# Patient Record
Sex: MALE | Race: WHITE | NOT HISPANIC OR LATINO | Employment: PART TIME | URBAN - METROPOLITAN AREA
[De-identification: names, ages, dates, MRNs, and addresses within clinical notes are randomized per-mention and may not be internally consistent; named-entity substitution may affect disease eponyms.]

---
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
## 2024-10-10 ENCOUNTER — OFFICE VISIT (OUTPATIENT)
Dept: URGENT CARE | Facility: PHYSICIAN GROUP | Age: 19
End: 2024-10-10
Payer: COMMERCIAL

## 2024-10-10 VITALS
DIASTOLIC BLOOD PRESSURE: 74 MMHG | HEART RATE: 74 BPM | OXYGEN SATURATION: 98 % | SYSTOLIC BLOOD PRESSURE: 116 MMHG | TEMPERATURE: 97.4 F | BODY MASS INDEX: 26.73 KG/M2 | HEIGHT: 68 IN | WEIGHT: 176.4 LBS | RESPIRATION RATE: 14 BRPM

## 2024-10-10 DIAGNOSIS — J06.9 VIRAL URI WITH COUGH: ICD-10-CM

## 2024-10-10 LAB
FLUAV RNA SPEC QL NAA+PROBE: NEGATIVE
FLUBV RNA SPEC QL NAA+PROBE: NEGATIVE
RSV RNA SPEC QL NAA+PROBE: NEGATIVE
SARS-COV-2 RNA RESP QL NAA+PROBE: NEGATIVE

## 2024-10-10 PROCEDURE — 99213 OFFICE O/P EST LOW 20 MIN: CPT | Performed by: STUDENT IN AN ORGANIZED HEALTH CARE EDUCATION/TRAINING PROGRAM

## 2024-10-10 PROCEDURE — 3078F DIAST BP <80 MM HG: CPT | Performed by: STUDENT IN AN ORGANIZED HEALTH CARE EDUCATION/TRAINING PROGRAM

## 2024-10-10 PROCEDURE — 3074F SYST BP LT 130 MM HG: CPT | Performed by: STUDENT IN AN ORGANIZED HEALTH CARE EDUCATION/TRAINING PROGRAM

## 2024-10-10 PROCEDURE — 0241U POCT CEPHEID COV-2, FLU A/B, RSV - PCR: CPT | Performed by: STUDENT IN AN ORGANIZED HEALTH CARE EDUCATION/TRAINING PROGRAM

## 2024-10-10 ASSESSMENT — ENCOUNTER SYMPTOMS
MUSCULOSKELETAL NEGATIVE: 1
GASTROINTESTINAL NEGATIVE: 1
EYES NEGATIVE: 1
COUGH: 1
CHILLS: 1
CARDIOVASCULAR NEGATIVE: 1

## 2025-04-14 ENCOUNTER — OFFICE VISIT (OUTPATIENT)
Dept: URGENT CARE | Facility: PHYSICIAN GROUP | Age: 20
End: 2025-04-14
Payer: COMMERCIAL

## 2025-04-14 VITALS
DIASTOLIC BLOOD PRESSURE: 70 MMHG | TEMPERATURE: 97.6 F | WEIGHT: 180.2 LBS | SYSTOLIC BLOOD PRESSURE: 112 MMHG | RESPIRATION RATE: 17 BRPM | HEART RATE: 79 BPM | HEIGHT: 67 IN | OXYGEN SATURATION: 96 % | BODY MASS INDEX: 28.28 KG/M2

## 2025-04-14 DIAGNOSIS — J02.9 SORE THROAT: ICD-10-CM

## 2025-04-14 DIAGNOSIS — B00.1 COLD SORE: ICD-10-CM

## 2025-04-14 DIAGNOSIS — J02.0 STREP PHARYNGITIS: ICD-10-CM

## 2025-04-14 DIAGNOSIS — B34.9 VIRAL SYNDROME: ICD-10-CM

## 2025-04-14 LAB — S PYO DNA SPEC NAA+PROBE: DETECTED

## 2025-04-14 PROCEDURE — 99214 OFFICE O/P EST MOD 30 MIN: CPT | Performed by: NURSE PRACTITIONER

## 2025-04-14 PROCEDURE — 3074F SYST BP LT 130 MM HG: CPT | Performed by: NURSE PRACTITIONER

## 2025-04-14 PROCEDURE — 3078F DIAST BP <80 MM HG: CPT | Performed by: NURSE PRACTITIONER

## 2025-04-14 PROCEDURE — 87651 STREP A DNA AMP PROBE: CPT | Performed by: NURSE PRACTITIONER

## 2025-04-14 RX ORDER — AMOXICILLIN 500 MG/1
500 CAPSULE ORAL 2 TIMES DAILY
Qty: 20 CAPSULE | Refills: 0 | Status: SHIPPED | OUTPATIENT
Start: 2025-04-14 | End: 2025-04-24

## 2025-04-14 RX ORDER — VALACYCLOVIR HYDROCHLORIDE 1 G/1
1000 TABLET, FILM COATED ORAL 2 TIMES DAILY
Qty: 14 TABLET | Refills: 0 | Status: SHIPPED | OUTPATIENT
Start: 2025-04-14 | End: 2025-04-14

## 2025-04-14 RX ORDER — ACYCLOVIR 800 MG/1
800 TABLET ORAL 2 TIMES DAILY
Qty: 20 TABLET | Refills: 0 | Status: SHIPPED | OUTPATIENT
Start: 2025-04-14

## 2025-04-14 RX ORDER — VALACYCLOVIR HYDROCHLORIDE 1 G/1
1000 TABLET, FILM COATED ORAL 2 TIMES DAILY
Qty: 14 TABLET | Refills: 0 | Status: SHIPPED | OUTPATIENT
Start: 2025-04-14 | End: 2025-04-21

## 2025-04-14 RX ORDER — AMOXICILLIN 500 MG/1
500 CAPSULE ORAL 2 TIMES DAILY
Qty: 20 CAPSULE | Refills: 0 | Status: SHIPPED | OUTPATIENT
Start: 2025-04-14 | End: 2025-04-14

## 2025-04-14 NOTE — LETTER
April 15, 2025    April 14, 2025       Patient: Jayy Tran   YOB: 2005   Date of Visit: 4/14/2025         To Whom It May Concern:    In my medical opinion, I recommend that Jayy Tran be excused from work 4/14/2025 - 4/17/2025 due to illness.     If you have any questions or concerns, please don't hesitate to call [phone]          Sincerely,          Flor Evans, A.P.R.N.  Electronically Signed             Bianka Kwok, Med Ass't  [phone]

## 2025-04-14 NOTE — LETTER
April 14, 2025       Patient: Jayy Tran   YOB: 2005   Date of Visit: 4/14/2025         To Whom It May Concern:    In my medical opinion, I recommend that Jayy Tran be excused from work 4/14/2025 and 4/15/2025 due to illness.     If you have any questions or concerns, please don't hesitate to call [phone]          Sincerely,          GEO MederosREdouardN.  Electronically Signed

## 2025-04-15 NOTE — PROGRESS NOTES
Verbal consent was acquired by the patient to use [employer] ambient listening note generation during this visit          Chief Complaint   Patient presents with    Sinus Problem     X2days. Scratchy throat and ears, runny nose, mucus          History of Present Illness  The patient is a 19-year-old male who presents for evaluation of a sinus infection and cold sores.    He reports symptoms suggestive of a sinus infection, including an itchy throat, nasal congestion, rhinorrhea, and pruritus in the ears. He also experiences a dry cough and throat pain, which was more pronounced at night but has since evolved into a sensation of dryness. He has no history of streptococcal infections. He has been managing these symptoms with over-the-counter DayQuil Cold and Flu, which provided some relief during the night but was ineffective during his work shift.    Additionally, he notes the development of a cold sore the previous night, a condition that tends to manifest whenever he falls ill. He expresses concern about the potential for scarring from these cold sores. He has a scheduled appointment with his primary care physician in mid-May 2025 and is seeking a prescription for Valtrex prior to this consultation.    FAMILY HISTORY  His grandmother and mother get cold sores.    MEDICATIONS  Current: DayQuil Cold and Flu         ROS:    No severe shortness of breath   No cardiac like chest pain, as discussed   As otherwise stated in HPI    Medical/SX/ Social History:  Reviewed per chart    Pertinent Medications:    No current outpatient medications on file prior to visit.     No current facility-administered medications on file prior to visit.        Allergies:    Patient has no known allergies.     Problem list, medications, and allergies reviewed by myself today in Epic     Physical Exam:    Vitals:    04/14/25 1449   BP: 112/70   Pulse: 79   Resp: 17   Temp: 36.4 °C (97.6 °F)   SpO2: 96%             Physical Exam  Vitals and  nursing note reviewed.   Constitutional:       General: He is not in acute distress.     Appearance: Normal appearance. He is not ill-appearing or toxic-appearing.   HENT:      Head: Normocephalic and atraumatic.      Right Ear: Tympanic membrane, ear canal and external ear normal.      Left Ear: Tympanic membrane, ear canal and external ear normal.      Nose: Congestion and rhinorrhea present.      Mouth/Throat:      Mouth: Mucous membranes are moist.      Pharynx: Uvula midline. Pharyngeal swelling and posterior oropharyngeal erythema present. No oropharyngeal exudate, uvula swelling or postnasal drip.      Tonsils: No tonsillar exudate. 1+ on the right. 1+ on the left.   Eyes:      Extraocular Movements: Extraocular movements intact.      Conjunctiva/sclera: Conjunctivae normal.      Pupils: Pupils are equal, round, and reactive to light.   Cardiovascular:      Rate and Rhythm: Normal rate.      Pulses: Normal pulses.   Pulmonary:      Effort: Pulmonary effort is normal.   Musculoskeletal:         General: Normal range of motion.      Cervical back: Normal range of motion.   Skin:     General: Skin is warm.      Capillary Refill: Capillary refill takes less than 2 seconds.   Neurological:      General: No focal deficit present.      Mental Status: He is alert and oriented to person, place, and time.            Diagnostics:    Results for orders placed or performed in visit on 04/14/25   POCT GROUP A STREP, PCR    Collection Time: 04/14/25  3:05 PM   Result Value Ref Range    POC Group A Strep, PCR Detected (A) Not Detected, Invalid         Medical Decision making and plan :  I personally reviewed prior external notes and test results pertinent to today's visit. Pt is clinically stable at today's acute urgent care visit.  Patient appears nontoxic with no acute distress noted. Appropriate for outpatient care at this time.    Pleasant 19 y.o. male presented clinic with:     Assessment & Plan  1. Strep  pharyngitis.    Rapid POC Strep testing POSITIVE  Management includes completion of antibiotics, new toothbrush after day 3 of antibiotics, discarding/sanitizing any other dental equipment, soft foods, increased fluids, remain home from school for 24 hours.   Management of symptoms is discussed and expected course is outlined.   Patient instructed to return to office in 24-48 hours if not improving  Patient instructed to present to Emergency Room if notes unilateral swelling, muffled voice, difficulty handling secretions  I considered other causes of pharyngitis including Group C, G strep, peritonsillar abscess, alhaji's angina, and retropharyngeal abscess but the patient's reported symptoms and my exam do not support these alternative diagnosis based on information I have available today.  This may change and I encouraged the patient to return to clinic if they are experiencing new symptoms or their symptoms fail to resolve with time as we cannot rule out all pathology from a single Urgent Care visit.     2. Cold sores.  Chronic and ongoing. A prescription for Valtrex has been issued to [facility]. He is instructed to take Valtrex twice daily until symptoms x 7 days.  He will establish care with PCP at the end of May and they will take over care of this chronic condition.       Shared decision-making was utilized with patient for treatment plan. Medication discussed included indication for use and the potential benefits and side effects. Education was provided regarding the aforementioned assessments.  Differential Diagnosis, natural history, and supportive care discussed. All of the patient's questions were answered to their satisfaction at the time of discharge. Patient was encouraged to monitor symptoms closely. Those signs and symptoms which would warrant concern and mandate seeking a higher level of service through the emergency department discussed at length.  Patient stated agreement and understanding of  this plan of care.    Disposition:  Home in stable condition     Voice Recognition Disclaimer:  Portions of this document were created using voice recognition software and [employer] technology provided by [facility]. The software does have a chance of producing errors of grammar and possibly content. I have made every reasonable attempt to correct obvious errors, but there may be errors of grammar and possibly content that I did not discover before finalizing the  documentation.    TREVER Mederos.

## 2025-05-23 ENCOUNTER — APPOINTMENT (OUTPATIENT)
Dept: MEDICAL GROUP | Facility: LAB | Age: 20
End: 2025-05-23
Payer: COMMERCIAL

## 2025-06-18 ENCOUNTER — OFFICE VISIT (OUTPATIENT)
Dept: URGENT CARE | Facility: PHYSICIAN GROUP | Age: 20
End: 2025-06-18
Payer: COMMERCIAL

## 2025-06-18 VITALS
HEART RATE: 77 BPM | HEIGHT: 67 IN | OXYGEN SATURATION: 100 % | SYSTOLIC BLOOD PRESSURE: 136 MMHG | TEMPERATURE: 98 F | DIASTOLIC BLOOD PRESSURE: 80 MMHG | BODY MASS INDEX: 28.03 KG/M2 | RESPIRATION RATE: 16 BRPM | WEIGHT: 178.6 LBS

## 2025-06-18 DIAGNOSIS — S61.412A LACERATION OF LEFT HAND WITHOUT FOREIGN BODY, INITIAL ENCOUNTER: Primary | ICD-10-CM

## 2025-06-18 PROCEDURE — 12002 RPR S/N/AX/GEN/TRNK2.6-7.5CM: CPT | Performed by: NURSE PRACTITIONER

## 2025-06-18 PROCEDURE — 3079F DIAST BP 80-89 MM HG: CPT | Performed by: NURSE PRACTITIONER

## 2025-06-18 PROCEDURE — 3075F SYST BP GE 130 - 139MM HG: CPT | Performed by: NURSE PRACTITIONER

## 2025-06-19 NOTE — PROGRESS NOTES
Verbal consent was acquired by the patient to use [employer] ambient listening note generation during this visit          Chief Complaint   Patient presents with    Laceration     Left knuckle cut with plastic           History of Present Illness  The patient is a 19-year-old male who presents for evaluation of a hand laceration.    He sustained the injury approximately 30 minutes prior to his arrival while attempting to separate the plastic lens from a headlight. He reports minimal pain, describing it as a stinging sensation. The bleeding was not severe, and he managed to control it by washing the wound with water and applying pressure. His tetanus status is up-to-date, with the last booster administered during his high school years. He has a history of similar injuries, having required sutures on his fingers on several occasions.         ROS:    No severe shortness of breath   No cardiac like chest pain, as discussed   As otherwise stated in HPI    Medical/SX/ Social History:  Reviewed per chart    Pertinent Medications:    Medications Ordered Prior to Encounter[1]     Allergies:    Patient has no known allergies.     Problem list, medications, and allergies reviewed by myself today in Epic     Physical Exam:    Vitals:    06/18/25 1837   BP: 136/80   Pulse: 77   Resp: 16   Temp: 36.7 °C (98 °F)   SpO2: 100%             Physical Exam  Vitals and nursing note reviewed.   Constitutional:       General: He is not in acute distress.     Appearance: Normal appearance. He is not ill-appearing or toxic-appearing.   HENT:      Head: Normocephalic and atraumatic.      Nose: Nose normal.      Mouth/Throat:      Mouth: Mucous membranes are moist.      Pharynx: Oropharynx is clear.   Eyes:      Extraocular Movements: Extraocular movements intact.      Conjunctiva/sclera: Conjunctivae normal.      Pupils: Pupils are equal, round, and reactive to light.   Cardiovascular:      Rate and Rhythm: Normal rate.      Pulses: Normal  pulses.   Pulmonary:      Effort: Pulmonary effort is normal.   Musculoskeletal:         General: Normal range of motion.      Cervical back: Normal range of motion.      Comments: There are two lacerations to the back of the left hand.  One is in-between the index and middle finger at the base of these two fingers. The other is over the first MCP joint of the index finger.  Both wounds are superficial in nature and have well approximated wound edges.  They are about 2.0 and 2.5 cm respectively.  Wound edges are well approximated.  There is full ROM of the hand and fingers without pain.  Sensation and strength are intact.  Capillary refill is intact.      Both wounds were cleansed and irrigated with Hibiclens.  No foreign bodies were identified.     Skin:     General: Skin is warm.      Capillary Refill: Capillary refill takes less than 2 seconds.   Neurological:      General: No focal deficit present.      Mental Status: He is alert and oriented to person, place, and time.          Medical Decision making and plan :  I personally reviewed prior external notes and test results pertinent to today's visit. Pt is clinically stable at today's acute urgent care visit.  Patient appears nontoxic with no acute distress noted. Appropriate for outpatient care at this time.    Pleasant 19 y.o. male presented clinic with:     Assessment & Plan  1. Laceration of the left hand.   - The laceration occurred while attempting to remove the plastic lens of a headlight.  - The wound was cleaned and prepared for suturing.  - Approximately 3 stitches will be placed in one area and 2 stitches in another.  - Advised to keep the wound clean and covered, especially while at work, to prevent infection. Tetanus status is up to date, so no additional tetanus shot is required.    The wound was thoroughly irrigated with copious amount of normal saline.   Area was then prepped in the usual sterile fashion.    Local field block was obtained with 1%  Lidocaine without Epinephrine.    Wound was cleansed and debrided of as much devitalized tissue as possible.  Wound explored and found to be relatively superficial and no foreign bodies appreciated.  I approximated the wound edges and closed the laceration with  interrupted sutures of #5-0 ETHILON monofilament.  Area was then cleansed and dressed.    Wound care instructions and ER precautions given.   RTC in 7-10 d for wound check/suture removal.     Tetanus vaccination UTD, patient remembers this vaccine a couple of years ago.  Last one we have record of is 2016.  Patient defers.      Post-Procedure:  - Tolerance Level: Patient tolerated the procedure well.  - Home Care Instructions: Advised to keep the wound clean and dry, change the dressing daily, avoid heavy use of the hand, and return for suture removal in 7-10 days. Instructed to watch for signs of infection such as increased redness, swelling, or discharge.      Shared decision-making was utilized with patient for treatment plan. Medication discussed included indication for use and the potential benefits and side effects. Education was provided regarding the aforementioned assessments.  Differential Diagnosis, natural history, and supportive care discussed. All of the patient's questions were answered to their satisfaction at the time of discharge. Patient was encouraged to monitor symptoms closely. Those signs and symptoms which would warrant concern and mandate seeking a higher level of service through the emergency department discussed at length.  Patient stated agreement and understanding of this plan of care.    Disposition:  Home in stable condition     Voice Recognition Disclaimer:  Portions of this document were created using voice recognition software and [employer] technology provided by [employer]. The software does have a chance of producing errors of grammar and possibly content. I have made every reasonable attempt to correct obvious errors, but there  may be errors of grammar and possibly content that I did not discover before finalizing the  documentation.    TREVER Mederos.        [1]   Current Outpatient Medications on File Prior to Visit   Medication Sig Dispense Refill    acyclovir (ZOVIRAX) 800 MG Tab Take 1 Tablet by mouth 2 times a day. 20 Tablet 0     No current facility-administered medications on file prior to visit.

## 2025-06-20 ENCOUNTER — OFFICE VISIT (OUTPATIENT)
Dept: MEDICAL GROUP | Facility: LAB | Age: 20
End: 2025-06-20
Payer: COMMERCIAL

## 2025-06-20 ENCOUNTER — HOSPITAL ENCOUNTER (OUTPATIENT)
Dept: LAB | Facility: MEDICAL CENTER | Age: 20
End: 2025-06-20
Attending: PHYSICIAN ASSISTANT
Payer: COMMERCIAL

## 2025-06-20 ENCOUNTER — RESULTS FOLLOW-UP (OUTPATIENT)
Dept: MEDICAL GROUP | Facility: LAB | Age: 20
End: 2025-06-20

## 2025-06-20 VITALS
BODY MASS INDEX: 27.78 KG/M2 | OXYGEN SATURATION: 95 % | DIASTOLIC BLOOD PRESSURE: 70 MMHG | TEMPERATURE: 96.9 F | HEART RATE: 66 BPM | WEIGHT: 177 LBS | HEIGHT: 67 IN | SYSTOLIC BLOOD PRESSURE: 106 MMHG | RESPIRATION RATE: 16 BRPM

## 2025-06-20 DIAGNOSIS — Z00.00 ENCOUNTER FOR PREVENTIVE HEALTH EXAMINATION: ICD-10-CM

## 2025-06-20 DIAGNOSIS — Z23 NEED FOR VACCINATION: ICD-10-CM

## 2025-06-20 DIAGNOSIS — B00.1 COLD SORE: ICD-10-CM

## 2025-06-20 DIAGNOSIS — Z80.0 FAMILY HISTORY OF COLON CANCER: ICD-10-CM

## 2025-06-20 DIAGNOSIS — Z83.3 FAMILY HISTORY OF DIABETES MELLITUS: ICD-10-CM

## 2025-06-20 DIAGNOSIS — Z83.49 FAMILY HISTORY OF HYPOTHYROIDISM: ICD-10-CM

## 2025-06-20 DIAGNOSIS — Z00.00 ENCOUNTER FOR PREVENTIVE HEALTH EXAMINATION: Primary | ICD-10-CM

## 2025-06-20 DIAGNOSIS — Z80.6 FAMILY HISTORY OF LEUKEMIA: ICD-10-CM

## 2025-06-20 LAB
ALBUMIN SERPL BCP-MCNC: 4.8 G/DL (ref 3.2–4.9)
ALBUMIN/GLOB SERPL: 2.2 G/DL
ALP SERPL-CCNC: 69 U/L (ref 30–99)
ALT SERPL-CCNC: 33 U/L (ref 2–50)
ANION GAP SERPL CALC-SCNC: 10 MMOL/L (ref 7–16)
AST SERPL-CCNC: 44 U/L (ref 12–45)
BILIRUB SERPL-MCNC: 1 MG/DL (ref 0.1–1.5)
BUN SERPL-MCNC: 17 MG/DL (ref 8–22)
CALCIUM ALBUM COR SERPL-MCNC: 9 MG/DL (ref 8.5–10.5)
CALCIUM SERPL-MCNC: 9.6 MG/DL (ref 8.5–10.5)
CHLORIDE SERPL-SCNC: 105 MMOL/L (ref 96–112)
CHOLEST SERPL-MCNC: 262 MG/DL (ref 100–199)
CO2 SERPL-SCNC: 23 MMOL/L (ref 20–33)
CREAT SERPL-MCNC: 1.05 MG/DL (ref 0.5–1.4)
ERYTHROCYTE [DISTWIDTH] IN BLOOD BY AUTOMATED COUNT: 40.8 FL (ref 35.9–50)
GFR SERPLBLD CREATININE-BSD FMLA CKD-EPI: 104 ML/MIN/1.73 M 2
GLOBULIN SER CALC-MCNC: 2.2 G/DL (ref 1.9–3.5)
GLUCOSE SERPL-MCNC: 90 MG/DL (ref 65–99)
HCT VFR BLD AUTO: 50.7 % (ref 42–52)
HCV AB SER QL: NORMAL
HDLC SERPL-MCNC: 42 MG/DL
HGB BLD-MCNC: 17.2 G/DL (ref 14–18)
LDLC SERPL CALC-MCNC: 180 MG/DL
MCH RBC QN AUTO: 30.6 PG (ref 27–33)
MCHC RBC AUTO-ENTMCNC: 33.9 G/DL (ref 32.3–36.5)
MCV RBC AUTO: 90.1 FL (ref 81.4–97.8)
PLATELET # BLD AUTO: 254 K/UL (ref 164–446)
PMV BLD AUTO: 11.2 FL (ref 9–12.9)
POTASSIUM SERPL-SCNC: 4.8 MMOL/L (ref 3.6–5.5)
PROT SERPL-MCNC: 7 G/DL (ref 6–8.2)
RBC # BLD AUTO: 5.63 M/UL (ref 4.7–6.1)
SODIUM SERPL-SCNC: 138 MMOL/L (ref 135–145)
TRIGL SERPL-MCNC: 199 MG/DL (ref 0–149)
TSH SERPL DL<=0.005 MIU/L-ACNC: 1.07 UIU/ML (ref 0.38–5.33)
WBC # BLD AUTO: 6.6 K/UL (ref 4.8–10.8)

## 2025-06-20 PROCEDURE — 36415 COLL VENOUS BLD VENIPUNCTURE: CPT

## 2025-06-20 PROCEDURE — 3074F SYST BP LT 130 MM HG: CPT | Performed by: PHYSICIAN ASSISTANT

## 2025-06-20 PROCEDURE — 90471 IMMUNIZATION ADMIN: CPT | Performed by: PHYSICIAN ASSISTANT

## 2025-06-20 PROCEDURE — 3078F DIAST BP <80 MM HG: CPT | Performed by: PHYSICIAN ASSISTANT

## 2025-06-20 PROCEDURE — 84443 ASSAY THYROID STIM HORMONE: CPT

## 2025-06-20 PROCEDURE — 90621 MENB-FHBP VACC 2/3 DOSE IM: CPT | Mod: JZ | Performed by: PHYSICIAN ASSISTANT

## 2025-06-20 PROCEDURE — 86803 HEPATITIS C AB TEST: CPT

## 2025-06-20 PROCEDURE — 80053 COMPREHEN METABOLIC PANEL: CPT

## 2025-06-20 PROCEDURE — 85027 COMPLETE CBC AUTOMATED: CPT

## 2025-06-20 PROCEDURE — 80061 LIPID PANEL: CPT

## 2025-06-20 PROCEDURE — 99213 OFFICE O/P EST LOW 20 MIN: CPT | Mod: 25 | Performed by: PHYSICIAN ASSISTANT

## 2025-06-20 RX ORDER — ACYCLOVIR 800 MG/1
800 TABLET ORAL 2 TIMES DAILY
Qty: 20 TABLET | Refills: 0 | Status: CANCELLED | OUTPATIENT
Start: 2025-06-20

## 2025-06-20 ASSESSMENT — PATIENT HEALTH QUESTIONNAIRE - PHQ9: CLINICAL INTERPRETATION OF PHQ2 SCORE: 0

## 2025-06-20 NOTE — ASSESSMENT & PLAN NOTE
Patient uses acyclovir as needed for cold sore outbreak.  Symptoms are well-controlled and patient is aware of his triggers.

## 2025-06-20 NOTE — PROGRESS NOTES
Subjective  Jayy Tran is a 19 y.o. male who presents today to establish care.    Previously seen by pediatrician, Dr. Mohsen Garcia    He is status post suture closure following laceration to left hand while attempting to remove plastic lens from a headlight.  He is doing well, there are no signs of infection to the area  Denies fever, chills, nausea    History of cold sores  Patient reports his triggers are going out into the cold following a hot shower, and being under a lot of stress.  Patient has been utilizing acyclovir for symptom control.     Heart murmur  Patient reports that he was found to have a heart murmur when he was 13 years old while he was playing a combination of for sports.  He was advised to slow down, and symptoms resolved.  He denies any palpitations, chest pain, shortness of breath.    History of ear infections when he was 1 to 1 years old  Currently not experiencing any ear pain or discharge.     Tinnitus  Followed by ENT Dr. Braeden Jeknins at [facility]  Patient was given recommendations to help reduce symptoms    Health maintenance  Oral hygiene: Dental cleanings every 12 months, and sees a dentist every year  Eye care: Sees an optometrist yearly.  Patient has astigmatism. Wears glasses for driving at night or with certain weather conditions  Mental health: Well-groomed, does not report any recent adverse events  Medications: None.  Acyclovir as needed for cold sore  Sleep: Sleeping well, did not report trouble falling asleep or staying asleep  Diet/exercise: Balanced diet, exercises regularly. Weekly runs with his mom, also enjoys going to the park and being active with his friends.  BM: Daily, soft.  Not straining.  Denied hematochezia  : Denied urgency, frequency, dysuria, and hematuria  Sexually active: No monogamous relationship, denied STI testing, uses condoms for contraception    Family Hx: Patient's mom, Keyla was able to assist with family history  Mother,  Keyla: Bladder CA at age 24 former smoker. Yearly cystoscopies.   Father: Liver cirosis from alcoholism  Paternal grandmother: Leukemia   Maternal Grandfather: Cholangiocarcinoma in the liver, Hypothyroidism   Mom's grandfather: colon cancer cancer in his 60s and ostomy bag   Maternal Grandmother: DM2, HTN, Hypothyroidism ([place]mmiotosi )       Social History     Socioeconomic History    Marital status: Single     Spouse name: Not on file    Number of children: Not on file    Years of education: Not on file    Highest education level: Not on file   Occupational History    Not on file   Tobacco Use    Smoking status: Never    Smokeless tobacco: Never   Vaping Use    Vaping status: Every Day    Substances: Nicotine   Substance and Sexual Activity    Alcohol use: Yes     Comment: occ    Drug use: No    Sexual activity: Never   Other Topics Concern    Behavioral problems Not Asked    Interpersonal relationships Not Asked    Sad or not enjoying activities Not Asked    Suicidal thoughts Not Asked    Poor school performance Not Asked    Reading difficulties Not Asked    Speech difficulties Not Asked    Writing difficulties Not Asked    Inadequate sleep Not Asked    Excessive TV viewing Not Asked    Excessive video game use Not Asked    Inadequate exercise Not Asked    Sports related Not Asked    Poor diet Not Asked    Family concerns for drug/alcohol abuse Not Asked    Poor oral hygiene Not Asked    Bike safety Not Asked    Family concerns vehicle safety Not Asked   Social History Narrative    Not on file     Social Drivers of Health     Financial Resource Strain: Not on file   Food Insecurity: Not on file   Transportation Needs: Not on file   Physical Activity: Not on file   Stress: Not on file   Social Connections: Not on file   Intimate Partner Violence: Not on file   Housing Stability: Not on file       Past Surgical History[1]    Past Medical History[2]    Current Medications[3]    Allergies[4]    Objective  /70  "(BP Location: Right arm, Patient Position: Sitting, BP Cuff Size: Adult)   Pulse 66   Temp 36.1 °C (96.9 °F) (Temporal)   Resp 16   Ht 1.702 m (5' 7\")   Wt 80.3 kg (177 lb)   SpO2 95%   Physical Exam  Constitutional:       Appearance: Normal appearance.   HENT:      Right Ear: Tympanic membrane, ear canal and external ear normal. There is no impacted cerumen.      Left Ear: Tympanic membrane, ear canal and external ear normal. There is no impacted cerumen.      Mouth/Throat:      Mouth: Mucous membranes are moist.      Pharynx: Oropharynx is clear. No oropharyngeal exudate or posterior oropharyngeal erythema.   Eyes:      General:         Right eye: No discharge.         Left eye: No discharge.      Extraocular Movements: Extraocular movements intact.      Pupils: Pupils are equal, round, and reactive to light.   Cardiovascular:      Rate and Rhythm: Normal rate and regular rhythm.   Pulmonary:      Effort: Pulmonary effort is normal.      Breath sounds: Normal breath sounds.   Musculoskeletal:      Cervical back: Normal range of motion.      Right lower leg: No edema.      Left lower leg: No edema.   Lymphadenopathy:      Cervical: No cervical adenopathy.   Skin:     General: Skin is warm and dry.   Neurological:      Mental Status: He is alert.   Psychiatric:         Mood and Affect: Mood normal.         Behavior: Behavior normal.         Thought Content: Thought content normal.         Labs:   Component      Latest Ref [place] 11/2/2020   WBC      4.8 - 10.8 K/uL 5.7    RBC      4.70 - 6.10 M/uL 5.53    Hemoglobin      14.0 - 18.0 g/dL 16.9    Hematocrit      42.0 - 52.0 % 49.8    MCV      81.4 - 97.8 fL 90.1    MCH      27.0 - 33.0 pg 30.6    MCHC      33.7 - 35.3 g/dL 33.9    RDW      37.1 - 44.2 fL 41.0    Platelet Count      164 - 446 K/uL 242    MPV      9.0 - 12.9 fL 11.6    Neutrophils-Polys      44.00 - 72.00 % 51.20    Lymphocytes      22.00 - 41.00 % 39.40    Monocytes      0.00 - 13.40 % 6.20  "   Eosinophils      0.00 - 4.00 % 2.30    Basophils      0.00 - 1.80 % 0.70    Immature Granulocytes      0.00 - 0.30 % 0.20    Nucleated RBC      /100 WBC 0.00    Neutrophils (Absolute)      1.54 - 7.04 K/uL 2.90    Lymphs (Absolute)      1.00 - 4.80 K/uL 2.23    Monos (Absolute)      0.18 - 0.78 K/uL 0.35    Eos (Absolute)      0.00 - 0.38 K/uL 0.13    Baso (Absolute)      0.00 - 0.05 K/uL 0.04    Immature Granulocytes (abs)      0.00 - 0.03 K/uL 0.01    NRBC (Absolute)      K/uL 0.00    Sodium      135 - 145 mmol/L 143    Potassium      3.6 - 5.5 mmol/L 4.1    Chloride      96 - 112 mmol/L 106    Co2      20 - 33 mmol/L 26    Anion Gap      7.0 - 16.0  11.0    Glucose      40 - 99 mg/dL 90    Bun      8 - 22 mg/dL 11    Creatinine      0.50 - 1.40 mg/dL 1.00    Calcium      8.5 - 10.5 mg/dL 9.6    AST(SGOT)      12 - 45 U/L 10 (L)    ALT(SGPT)      2 - 50 U/L 10    Alkaline Phosphatase      80 - 250 U/L 75 (L)    Total Bilirubin      0.1 - 1.2 mg/dL 0.6    Albumin      3.2 - 4.9 g/dL 4.6    Total Protein      6.0 - 8.2 g/dL 6.9    Globulin      1.9 - 3.5 g/dL 2.3    A-G Ratio      g/dL 2.0    TSH      0.680 - 3.350 uIU/mL 0.924       Legend:  (L) Low    Assessment & Plan  Encounter for preventive health examination  A lipid panel, thyroid function tests, A1c, CBC, and CMP will be ordered. Advised to fast for 8 to 10 hours prior to the blood draw.    Due to family history of a variety of cancers I recommend moving forward with the [facility] project for genetic testing. The genomic study is at no-cost for [facility] and gives participants insight into different genetic traits, including results on three prevalent and serious health conditions.  Patient verbalizes understanding and agreed to proceed.    Orders:    Lipid Profile; Future    CBC WITHOUT DIFFERENTIAL; Future    Comp Metabolic Panel; Future    TSH WITH REFLEX TO FT4; Future    HEP C VIRUS ANTIBODY; Future    Cold sore  Patient uses acyclovir as needed  for cold sore outbreak.  Symptoms are well-controlled and patient is aware of his triggers.               1. Encounter for preventive health examination  - Lipid Profile; Future  - CBC WITHOUT DIFFERENTIAL; Future  - Comp Metabolic Panel; Future  - TSH WITH REFLEX TO FT4; Future  - HEP C VIRUS ANTIBODY; Future    2. Cold sore    3. Need for vaccination  - Meningococcal Vaccine Serogroup B 2-3 Dose (TRUMENBA)    4. Family history of colon cancer  - Referral to Genetic Research Studies    5. Family history of diabetes mellitus  - Referral to Genetic Research Studies    6. Family history of leukemia  - Referral to Genetic Research Studies    7. Family history of hypothyroidism  - Referral to Genetic Research Studies           [1]   Past Surgical History:  Procedure Laterality Date    TYMPANOTOMY     [2]   Past Medical History:  Diagnosis Date    Arrhythmia     2012   [3]   Current Outpatient Medications   Medication Sig Dispense Refill    acyclovir (ZOVIRAX) 800 MG Tab Take 1 Tablet by mouth 2 times a day. 20 Tablet 0     No current facility-administered medications for this visit.   [4] No Known Allergies

## 2025-06-20 NOTE — ASSESSMENT & PLAN NOTE
A lipid panel, thyroid function tests, A1c, CBC, and CMP will be ordered. Advised to fast for 8 to 10 hours prior to the blood draw.    Due to family history of a variety of cancers I recommend moving forward with the [facility] project for genetic testing. The genomic study is at no-cost for [facility] and gives participants insight into different genetic traits, including results on three prevalent and serious health conditions.  Patient verbalizes understanding and agreed to proceed.    Orders:    Lipid Profile; Future    CBC WITHOUT DIFFERENTIAL; Future    Comp Metabolic Panel; Future    TSH WITH REFLEX TO FT4; Future    HEP C VIRUS ANTIBODY; Future

## 2025-06-27 ENCOUNTER — OFFICE VISIT (OUTPATIENT)
Dept: MEDICAL GROUP | Facility: LAB | Age: 20
End: 2025-06-27
Payer: COMMERCIAL

## 2025-06-27 VITALS
HEIGHT: 67 IN | RESPIRATION RATE: 18 BRPM | HEART RATE: 81 BPM | SYSTOLIC BLOOD PRESSURE: 102 MMHG | BODY MASS INDEX: 27.58 KG/M2 | WEIGHT: 175.71 LBS | TEMPERATURE: 98.6 F | DIASTOLIC BLOOD PRESSURE: 80 MMHG | OXYGEN SATURATION: 96 %

## 2025-06-27 DIAGNOSIS — Z48.02 VISIT FOR SUTURE REMOVAL: Primary | ICD-10-CM

## 2025-06-27 ASSESSMENT — FIBROSIS 4 INDEX: FIB4 SCORE: 0.6

## 2025-06-27 NOTE — PROGRESS NOTES
Subjective  Jayy Tran is a 19 y.o. male who presents today for suture removal.    He is status post suture closure following laceration to left hand while attempting to remove plastic lens from a headlight.  He is doing well, there are no signs of infection to the area  Denies fever, chills, nausea, or bleeding from suture site.    No family history on file.    Social History     Socioeconomic History    Marital status: Single     Spouse name: Not on file    Number of children: Not on file    Years of education: Not on file    Highest education level: Not on file   Occupational History    Not on file   Tobacco Use    Smoking status: Never    Smokeless tobacco: Never   Vaping Use    Vaping status: Every Day    Substances: Nicotine   Substance and Sexual Activity    Alcohol use: Yes     Comment: occ    Drug use: No    Sexual activity: Never   Other Topics Concern    Behavioral problems Not Asked    Interpersonal relationships Not Asked    Sad or not enjoying activities Not Asked    Suicidal thoughts Not Asked    Poor school performance Not Asked    Reading difficulties Not Asked    Speech difficulties Not Asked    Writing difficulties Not Asked    Inadequate sleep Not Asked    Excessive TV viewing Not Asked    Excessive video game use Not Asked    Inadequate exercise Not Asked    Sports related Not Asked    Poor diet Not Asked    Family concerns for drug/alcohol abuse Not Asked    Poor oral hygiene Not Asked    Bike safety Not Asked    Family concerns vehicle safety Not Asked   Social History Narrative    Not on file     Social Drivers of Health     Financial Resource Strain: Not on file   Food Insecurity: Not on file   Transportation Needs: Not on file   Physical Activity: Not on file   Stress: Not on file   Social Connections: Not on file   Intimate Partner Violence: Not on file   Housing Stability: Not on file       Past Surgical History[1]    Past Medical History[2]    Current  "Medications[3]    Allergies[4]    Objective  /80 (BP Location: Left arm, Patient Position: Sitting, BP Cuff Size: Adult)   Pulse 81   Temp 37 °C (98.6 °F) (Temporal)   Resp 18   Ht 1.702 m (5' 7\")   Wt 79.7 kg (175 lb 11.3 oz)   SpO2 96%   Physical Exam  Constitutional:       Appearance: Normal appearance.   HENT:      Mouth/Throat:      Mouth: Mucous membranes are moist.      Pharynx: Oropharynx is clear. No oropharyngeal exudate or posterior oropharyngeal erythema.   Eyes:      Extraocular Movements: Extraocular movements intact.      Pupils: Pupils are equal, round, and reactive to light.   Cardiovascular:      Rate and Rhythm: Normal rate and regular rhythm.   Pulmonary:      Effort: Pulmonary effort is normal.      Breath sounds: Normal breath sounds.   Abdominal:      General: Abdomen is flat. There is no distension.   Musculoskeletal:      Cervical back: Normal range of motion.      Right lower leg: No edema.      Left lower leg: No edema.   Skin:     General: Skin is warm and dry.      Comments: Wounds are free from any signs of infection   Neurological:      Mental Status: He is alert.   Psychiatric:         Mood and Affect: Mood normal.         Behavior: Behavior normal.         Thought Content: Thought content normal.         Assessment & Plan  Visit for suture removal  3 stitches were removed from 1 laceration knuckle of right hand.  2 stitches were removed from right hand below the knuckles.  Patient tolerated procedure well.  No bleeding following removal of sutures.    Wound was cleaned before and after removal of stitches, Steri-Strips were placed.   Home Care Instructions: Advised to keep the wound clean and dry. Instructed to watch for signs of infection such as increased redness, swelling, or discharge.                1. Visit for suture removal           [1]   Past Surgical History:  Procedure Laterality Date    TYMPANOTOMY     [2]   Past Medical History:  Diagnosis Date    Arrhythmia "     2012   [3]   Current Outpatient Medications   Medication Sig Dispense Refill    acyclovir (ZOVIRAX) 800 MG Tab Take 1 Tablet by mouth 2 times a day. 20 Tablet 0     No current facility-administered medications for this visit.   [4] No Known Allergies

## 2025-06-27 NOTE — ASSESSMENT & PLAN NOTE
3 stitches were removed from 1 laceration knuckle of right hand.  2 stitches were removed from right hand below the knuckles.  Patient tolerated procedure well.  No bleeding following removal of sutures.    Wound was cleaned before and after removal of stitches, Steri-Strips were placed.   Home Care Instructions: Advised to keep the wound clean and dry. Instructed to watch for signs of infection such as increased redness, swelling, or discharge.